# Patient Record
Sex: MALE | Race: WHITE | NOT HISPANIC OR LATINO | Employment: UNEMPLOYED | URBAN - METROPOLITAN AREA
[De-identification: names, ages, dates, MRNs, and addresses within clinical notes are randomized per-mention and may not be internally consistent; named-entity substitution may affect disease eponyms.]

---

## 2021-05-31 ENCOUNTER — RECORDS - HEALTHEAST (OUTPATIENT)
Dept: ADMINISTRATIVE | Facility: CLINIC | Age: 41
End: 2021-05-31

## 2022-09-22 ENCOUNTER — HOSPITAL ENCOUNTER (EMERGENCY)
Facility: CLINIC | Age: 42
Discharge: HOME | End: 2022-09-22
Attending: EMERGENCY MEDICINE | Admitting: EMERGENCY MEDICINE

## 2022-09-22 VITALS
HEART RATE: 94 BPM | RESPIRATION RATE: 16 BRPM | HEIGHT: 63 IN | BODY MASS INDEX: 22.15 KG/M2 | SYSTOLIC BLOOD PRESSURE: 143 MMHG | WEIGHT: 125 LBS | DIASTOLIC BLOOD PRESSURE: 82 MMHG | OXYGEN SATURATION: 100 % | TEMPERATURE: 99.2 F

## 2022-09-22 DIAGNOSIS — R45.4 IRRITABILITY AND ANGER: ICD-10-CM

## 2022-09-22 DIAGNOSIS — F32.A DEPRESSION, UNSPECIFIED DEPRESSION TYPE: ICD-10-CM

## 2022-09-22 PROCEDURE — 90791 PSYCH DIAGNOSTIC EVALUATION: CPT

## 2022-09-22 PROCEDURE — 99285 EMERGENCY DEPT VISIT HI MDM: CPT | Mod: 25

## 2022-09-22 ASSESSMENT — ACTIVITIES OF DAILY LIVING (ADL): ADLS_ACUITY_SCORE: 35

## 2022-09-22 NOTE — DISCHARGE INSTRUCTIONS
"If I am feeling unsafe or I am in a crisis, I will:   Contact my established care providers   Call the National Suicide Prevention Lifeline: 988  Go to the nearest emergency room   Call 911          Warning signs that I or other people might notice when a crisis is developing for me: Start to become agitated, raising my voice, punching walls, having \"anger episodes.\"    Things I am able to do on my own to cope or help me feel better: Exercise, walks, eating healthy.    Things that I am able to do with others to cope or help me better: Spending time with family and my niece.     Things I can use or do for distraction: Walks, watching movies.     Changes I can make to support my mental health and wellness: Eating healthier, seeking out an anger management therapist.     People in my life that I can ask for help: Parents, sister.     Your [facility] has a mental health crisis team you can call 24/7: [facility] Crisis Line Number: [phone]     Other things that are important when I m in crisis: Be there for me, talk to me, try to understand things from my perspective.     Additional resources and appointment information: Review literature for free Walk-In Counseling center for free mental health services.    Crisis Lines  Crisis Text Line  Text 432996  You will be connected with a trained live crisis counselor to provide support.    Gambling Hotline  1.135.851.hope [4673]    National Hope Line  1.800.SUICIDE [2526404]    National Suicide Prevention Lifeline  Free and confidential support  1.800.887.TALK [8317]  http://suicidepreventionlifeline.org    The Uli Project (LGBTQ Youth Crisis Line)  5.017.635.8545  text START to 140-146    's Crisis Line  4.080.301.5881 (Press 1)  or text 379584    Community Resources  Fast Tracker  Linking people to mental health and substance use disorder resources  Shopcaden.org     [place] Mental Health Warm Line  Peer to peer support  Monday thru " "Saturday, 12 pm to 10 pm  064.589.9431 or 2.813.745.9588  Text \"Support\" to 00977    National [place] on Mental Illness (DAYANNA)  028.952.4331 or 1.888.DAYANNA.HELPS    Walk-in Counseling Center  Free mental health counseling  [address] [facility]  230.775.1308    Mental Health Apps  My3  https://[employer]/    VirtualHopeBox  https://Definicare/apps/virtual-hope-box/    Suicide Safety Plan (Hudl)    Calm Harm      Additional information:  Today you were seen by a licensed mental health professional through Triage and Transition services, [facility] (P)  for a crisis assessment in the Emergency Department at [facility].  It is recommended that you follow up with your established providers (psychiatrist, mental health therapist, and/or primary care doctor - as relevant) as soon as possible. Coordinators from [facility] will be calling you in the next 24-48 hours to ensure that you have the resources you need.  You can also contact [facility] coordinators directly at [phone]. You may have been scheduled for or offered an appointment with a mental health provider. [facility] maintains an extensive network of licensed behavioral health providers to connect patients with the services they need.  We do not charge providers a fee to participate in our referral network.  We match patients with providers based on a patient's specific needs, insurance coverage, and location.  Our first effort will be to refer you to a provider within your care system, and will utilize providers outside your care system as needed.     "

## 2022-09-22 NOTE — ED PROVIDER NOTES
History     Chief Complaint:    Psychiatric Evaluation       HPI   Shawn Echevarria is a 42 year old male who presents with concern of his mental health.  Patient was in his apartment and was yelling and angry.  The patient's landlord became concerned so called police.  Upon police arrival they noted the patient was angry and yelling but then calm down some.  They have records of previous visits to this patient's residence for similar concerns.  The patient states he was angry about financial concerns.   has been unemployed for about 4 months.  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  States he has history of depression and anxiety managed by his general practitioner.  States he takes Wellbutrin and has been compliant with his medication.  Denies taking other mental health medications.  Denies drug or alcohol abuse.  He states he is aware he has issues with anger and has considered pursuing therapy for that.  He has not undergone psychotherapy in the past.  He is not being cared for by psychiatrist at this time.  Police stated concern for psychosis and schizophrenia with the patient denies a diagnosed history of that.  He states he does have family members who live in the area.  He does not have any friends.  Currently denies suicidal or homicidal ideation.  No other complaints.    Allergies:  No Known Allergies     Medications:    amoxicillin (AMOXIL) 500 MG capsule  BUPROPION HCL PO  CLARITIN 10 MG OR TABS  FLUTICASONE PROPIONATE (NASAL) 50 MCG/ACT NA SUSP  LORazepam (ATIVAN) 1 MG tablet  MINOCYCLINE  MG OR TABS      Past Medical History:    Past Medical History:   Diagnosis Date     kidney stone    Depression  Anxiety    Patient Active Problem List    Diagnosis Date Noted     Acne 06/10/2014     Priority: Medium     Dysthymic disorder 06/10/2014     Priority: Medium     Tourette disorder 06/10/2014     Priority: Medium     CARDIOVASCULAR SCREENING; LDL GOAL LESS THAN 160 10/31/2010     Priority:  "Medium     Chronic urticaria 03/19/2010     Priority: Medium        Past Surgical History:    Past Surgical History:   Procedure Laterality Date     HERNIA REPAIR      at age 9      nose suregry      injury realted in childhood       Family History:    family history is not on file.    Social History:   reports that he has never smoked. He has never used smokeless tobacco. He reports that he does not drink alcohol and does not use drugs.    PCP: No primary care provider on file.     Review of Systems  All systems otherwise negative or per HPI    Physical Exam     Patient Vitals for the past 24 hrs:   BP Temp Temp src Pulse Resp SpO2 Height Weight   09/22/22 1309 (!) 143/82 99.2  F (37.3  C) Temporal 94 16 100 % -- --   09/22/22 1304 -- -- -- -- -- -- 1.6 m (5' 3\") 56.7 kg (125 lb)      Physical Exam  SKIN:  Warm, dry.  HEMATOLOGIC/IMMUNOLOGIC/LYMPHATIC:  No pallor.  EYES:  Conjunctivae normal.  CARDIOVASCULAR:  Regular rate and rhythm.  No murmur.  RESPIRATORY:  No respiratory distress, breath sounds equal and normal.  GASTROINTESTINAL:  Soft, nontender abdomen.  MUSCULOSKELETAL: Normal body habitus.  NEUROLOGIC:  Alert, conversant.  PSYCHIATRIC:  Normal mood and affect.  Calm, cooperative.  Does not appear to be responding to internal stimuli.  Normal eye contact.    Emergency Department Course     Imaging:    No orders to display        Laboratory:    Labs Ordered and Resulted from Time of ED Arrival to Time of ED Departure - No data to display     Interventions:    Medications - No data to display     Emergency Department Course:  Past medical records, nursing notes, and vitals reviewed.  I performed an exam of the patient and obtained history, as documented above.  I rechecked the patient. Findings and plan explained to the patient. Patient was discharged.    Impression & Plan      Medical Decision Making:  This patient presents by police with concern about his behavior at his residence.  The patient states he " does suffer depression and anxiety and anger issues.  Denies suicidal or homicidal ideation.  He has been calm and cooperative in the emergency department.  Underwent a mental health assessment and was deemed safe to be discharged back to his residence since provided outpatient resources for further mental health care.  Advised to continue his Wellbutrin.  To return to the ED if any concerns in the meantime.    Diagnosis:    ICD-10-CM    1. Depression, unspecified depression type  F32.A    2. Irritability and anger  R45.4         Discharge Medications:  Discharge Medication List as of 9/22/2022  2:59 PM           9/22/2022   Jose Sharif MD Moe, James Thomas, MD  09/22/22 2703

## 2022-09-22 NOTE — ED NOTES
Bed: [facility]  Expected date:   Expected time:   Means of arrival:   Comments:  [place] - 43 M psych health on hold eta 9445

## 2022-09-22 NOTE — CONSULTS
"Diagnostic Evaluation Consultation  Crisis Assessment    Patient was assessed: In Person  Patient location: [place] ED BH1  Was a release of information signed: No. Reason: no providers.      Referral Data and Chief Complaint  Shawn is a 42 year old, who uses he/him pronouns, and presents to the ED via EMS. Patient is referred to the ED by other: landlord and neighbors. Patient is presenting to the ED for the following concerns: agitation.      Informed Consent and Assessment Methods     Patient is his own guardian. Writer met with patient and explained the crisis assessment process, including applicable information disclosures and limits to confidentiality, assessed understanding of the process, and obtained consent to proceed with the assessment. Patient was observed to be able to participate in the assessment as evidenced by acknowledged role of Writer and crisis assessment and answered questions when prompted. Assessment methods included conducting a formal interview with patient, review of medical records, collaboration with medical staff, and obtaining relevant collateral information from family and community providers when available.    Over the course of this crisis assessment provided reassurance, offered validation, engaged patient in problem solving and disposition planning, worked with patient on safety and aftercare planning and Walk-In Counseling literature. Patient's response to interventions was fair and engaged.     Summary of Patient Situation     Gaurav is a 42 year old male with he/him pronouns with a notable history of depression and anxiety who presents to the ED via EMS with concern of agitation and psychiatric evaluation.  Pt states he has \"been upset about money lately. I was just really, when I cry, talk to myself.\" Pt reports becoming upset today and getting agitated in his apartment. Pt reports while crying, he was punching his pillow and asking himself rhetorical questions out loud while alone in " "his apartment. Pt reports believing either his landlord or a neighbor must have heard him and called the police due to concern for Pt. Pt reports he has been increasingly upset about his financial situation for the past week or so. Per chart review, it was reported Pt was overheard stating what should he do, kill himself, which Writer brought to Pt's attention. Pt reports he probably made a statement regarding that, yet reports he frequently talks to himself outside to process his thoughts and emotions. Pt denies current/active SI, HI, AH/VH, or substance abuse. Pt denies any previous suicide attempts. Pt denies any previous hospitalizations for his mental health. Pt reports previous treatment for marijuana use about 10 years ago. Pt reports appetite and sleep has been \"fine\" and denied any concerns. Pt reports working with his PCP for mental health medication management. Pt denies any other outpatient providers. Pt reports being interested in working on his anger with an anger management therapist.    Brief Psychosocial History     Pt reports living alone in a one bedroom apartment and has been living there for the past 6 years. Pt denies any concerns with his current living situation. Pt reports family history of his parents, a sister, brother-in-law, and a niece. Pt denies any history of mental health or substance use concerns. Pt reports he is currently unemployed and has been for the past 4 months. Pt reports he used to work for [employer] as a , yet was fired due to amount of sick calls. Pt reports due to this, he is experiencing financial stress and concerns. Pt reports he has [facility] to assist with his finances. Pt did not report any  status. Pt denies any relevant legal issues or current cultural, Muslim, or spiritual influences on mental health care. Pt reports hobbies of skiing, going for walks, exercising, and watching movies. Pt " identifies his parents and sister as a part of his support system.    Significant Clinical History     Pt reports historical diagnoses of anxiety and depression since the age of 14. Pt reports historical substance abuse of marijuana and received chemical dependency treatment about 10 years ago at [facility]. Pt denies any current substance use. Per chart review, Pt has been using medical marijuana to address his Tourettes syndrome. Pt denies any current mental health symptoms, yet reports historically having anger issues and has punched walls in frustration and reports asking rhetorical questions out loud as a way to process his emotions. Pt reports working with his PCP for medication management. Pt denies any other members a part of his treatment team. Pt denies any past hospitalizations, commitments, or Leo orders. Pt reports treatment through [facility] for marijuana about 10 years ago. Pt denies any relevant trauma history.     Collateral Information  Writer attempted to collect collateral from Andrei Echevarria (father, [phone]), yet was unable to connect. Writer left a voicemail with instruction to call back to provide information.       Risk Assessment  ESS-6  1.a. Over the past 2 weeks, have you had thoughts of killing yourself? No  1.b. Have you ever attempted to kill yourself and, if yes, when did this last happen? No   2. Recent or current suicide plan? No   3. Recent or current intent to act on ideation? No  4. Lifetime psychiatric hospitalization? No  5. Pattern of excessive substance use? No - Pt reports historical substance abuse and receiving treatment 10 years ago.  6. Current irritability, agitation, or aggression? No  Scoring note: BOTH 1a and 1b must be yes for it to score 1 point, if both are not yes it is zero. All others are 1 point per number. If all questions 1a/1b - 6 are no, risk is negligible. If one of 1a/1b is yes, then risk is mild. If either question 2 or 3, but not both, is  yes, then risk is automatically moderate regardless of total score. If both 2 and 3 are yes, risk is automatically high regardless of total score.      Score: 0, negligible risk      Does the patient have access to lethal means? No     Does the patient engage in non-suicidal self-injurious behavior (NSSI/SIB)? no     Does the patient have thoughts of harming others? No     Is the patient engaging in sexually inappropriate behavior?  no         Current Substance Abuse     Is there recent substance abuse? no     Was a urine drug screen or blood alcohol level obtained: No       Mental Status Exam     Affect: Appropriate   Appearance: Appropriate    Attention Span/Concentration: Attentive  Eye Contact: Engaged   Fund of Knowledge: Appropriate    Language /Speech Content: Fluent   Language /Speech Volume: Normal    Language /Speech Rate/Productions: Normal    Recent Memory: Intact   Remote Memory: Intact   Mood: Anxious and Normal    Orientation to Person: Yes    Orientation to Place: Yes   Orientation to Time of Day: Yes    Orientation to Date: Yes    Situation (Do they understand why they are here?): Yes    Psychomotor Behavior: Normal    Thought Content: Clear and Other: reports having concern individuals has commented on his body odor.   Thought Form: Intact      History of commitment: No       Medication    Psychotropic medications: Yes. Pt is currently taking see below. Medication compliant: Yes. Recent medication changes: No  Medication changes made in the last two weeks: No  No current facility-administered medications for this encounter.     Current Outpatient Medications   Medication     amoxicillin (AMOXIL) 500 MG capsule     BUPROPION HCL PO     CLARITIN 10 MG OR TABS     FLUTICASONE PROPIONATE (NASAL) 50 MCG/ACT NA SUSP     LORazepam (ATIVAN) 1 MG tablet     MINOCYCLINE  MG OR TABS        Current Care Team    Primary Care Provider: Yes. Name: Joe Miller MD. Location: [address]  Medicine. Date of last visit: 7/28/2022. Frequency: see chart. Perceived helpfulness: yeah.  Psychiatrist: No  Therapist: No  : No     CTSS or ARMHS: No  ACT Team: No  Other: No      Diagnosis    311 (F32.9) Unspecified Depressive Disorder  - primary and - by history   300.00 (F41.9) Unspecified Anxiety Disorder - by history     Clinical Summary and Substantiation of Recommendations    Pt presents to the ED via EMS for concern of agitation and psychiatric evaluation. Pt does not endorse current/active SI, HI, AH/VH or active substance use/abuse. Pt does not present as experiencing psychosis due to displaying insight of recent life stressors of losing his job, not having insurance, and having financial stressors and being orientated with person, place, date, and situation. Based on the current presentation, available history, and collateral collected, there is no identified imminent risk of harm to self or others that would merit an involuntary hold. Pt reports having established outpatient medication provider and feels safe to discharge home and requests to do so.  Recommendation for Pt to discharge back to care of self and encouraged to continue follow up with services established with his PCP for medication management and seek additional therapy services through free clinics such as [facility].    Disposition    Recommended disposition: Individual Therapy and Medication Management       Reviewed case and recommendations with attending provider. Attending Name: Dr. Sharif       Attending concurs with disposition: Yes       Patient concurs with disposition: Yes       Guardian concurs with disposition: NA      Final disposition: Medication management and Other: provided literature for free mental health therpay services.     Inpatient Details (if applicable): NA    Outpatient Details (if applicable):   Aftercare plan and appointments placed in the AVS and provided to patient: Yes. Given to  "patient by ED RN upon discharge.    Was lethal means counseling provided as a part of aftercare planning? No;       Assessment Details    Patient interview started at: 1400 and completed at: 1420.     Total duration spent on the patient case in minutes: .50 hrs      CPT code(s) utilized: 70231 - Psychotherapy for Crisis - 60 (30-74*) min       [place], [facility],  [facility]  DEC - Triage & Transition Services  Callback: [phone]        If I am feeling unsafe or I am in a crisis, I will:   Contact my established care providers   Call the National Suicide Prevention Lifeline: 988  Go to the nearest emergency room   Call 911            Warning signs that I or other people might notice when a crisis is developing for me: Start to become agitated, raising my voice, punching walls, having \"anger episodes.\"     Things I am able to do on my own to cope or help me feel better: Exercise, walks, eating healthy.     Things that I am able to do with others to cope or help me better: Spending time with family and my niece.      Things I can use or do for distraction: Walks, watching movies.      Changes I can make to support my mental health and wellness: Eating healthier, seeking out an anger management therapist.      People in my life that I can ask for help: Parents, sister.      Your [facility] has a mental health crisis team you can call 24/7: [facility] Crisis Line Number: [phone]      Other things that are important when I m in crisis: Be there for me, talk to me, try to understand things from my perspective.      Additional resources and appointment information: Review literature for free Walk-In Counseling center for free mental health services.     Crisis Lines  Crisis Text Line  Text 814766  You will be connected with a trained live crisis counselor to provide support.     Gambling Hotline  1.800.333.hope [4673]     National Hope Line  1.800.SUICIDE [4572222]     National Suicide Prevention Lifeline  Free " "and confidential support  1.800.096.TALK [8651]  http://suicidepreventionlifeline.org     The Uli Project (LGBTQ Youth Crisis Line)  9.784.133.5542  text START to 496-007     's Crisis Line  5.490.183.0143 (Press 1)  or text 164344     Community Resources  Fast Tracker  Linking people to mental health and substance use disorder resources  [employer]      [place] Mental Health Warm Line  Peer to peer support  Monday thru Saturday, 12 pm to 10 pm  736.235.8820 or 8.755.358.1466  Text \"Support\" to 00820     National [place] on Mental Illness (DAYANNA)  864.487.3542 or 1.890.DAYANNA.HELPS     Walk-in Counseling Center  Free mental health counseling  [address] [facility]  685.988.1342     Mental Health Apps  My3  https://Markafoni.[employer]/     VirtualHopeBox  https://TUNJI/apps/virtual-hope-box/     Suicide Safety Plan (Skinfix)     Calm Harm        Additional information:  Today you were seen by a licensed mental health professional through Triage and Transition services, [facility] ([facility])  for a crisis assessment in the Emergency Department at [facility].  It is recommended that you follow up with your established providers (psychiatrist, mental health therapist, and/or primary care doctor - as relevant) as soon as possible. Coordinators from [facility] will be calling you in the next 24-48 hours to ensure that you have the resources you need.  You can also contact [facility] coordinators directly at [phone]. You may have been scheduled for or offered an appointment with a mental health provider. [facility] maintains an extensive network of licensed behavioral health providers to connect patients with the services they need.  We do not charge providers a fee to participate in our referral network.  We match patients with providers based on a patient's specific needs, insurance coverage, and location.  Our first effort will be to refer you to a " provider within your care system, and will utilize providers outside your care system as needed.

## 2022-09-22 NOTE — ED TRIAGE NOTES
"PD was called to pt's apartment after landlord heard banging and screaming inside. Pt was placed on a hold to get to ED for comments heard \"what do you want me to kill myself\"     Triage Assessment     Row Name 09/22/22 2437       Triage Assessment (Adult)    Airway WDL WDL       Respiratory WDL    Respiratory WDL WDL       Skin Circulation/Temperature WDL    Skin Circulation/Temperature WDL WDL       Cardiac WDL    Cardiac WDL WDL       Peripheral/Neurovascular WDL    Peripheral Neurovascular WDL WDL       Cognitive/Neuro/Behavioral WDL    Cognitive/Neuro/Behavioral WDL WDL              "

## 2022-09-22 NOTE — ED NOTES
I was asked to assist this patient with filling out insurance thru [place] [employer]. I explained to the DEC staff that it is registration who would give the Self Pay packet to the uninsured and with this patient I am happy to get the packet and give it to him.  I clarified with registration that a self pay packet was not given to the patient.  I gave the self pay packet to the patient and explained for him to call the highlighted numbers for help filling out paperwork for insurance.  He asked if the insurance is retro active and I explained that some are and some aren't and that the Financial Counselors are the ones that would have the specifics about what each insurance covers.  This patient has no further questions-- I did encourage him to call the numbers as soon as possible.   I updated the ER staff that I completed speaking with this patient re: insurance.